# Patient Record
Sex: FEMALE | Race: WHITE | NOT HISPANIC OR LATINO | Employment: UNEMPLOYED | URBAN - METROPOLITAN AREA
[De-identification: names, ages, dates, MRNs, and addresses within clinical notes are randomized per-mention and may not be internally consistent; named-entity substitution may affect disease eponyms.]

---

## 2018-07-12 ENCOUNTER — OFFICE VISIT (OUTPATIENT)
Dept: GYNECOLOGY | Facility: CLINIC | Age: 43
End: 2018-07-12
Payer: COMMERCIAL

## 2018-07-12 VITALS
SYSTOLIC BLOOD PRESSURE: 120 MMHG | HEIGHT: 64 IN | BODY MASS INDEX: 27.62 KG/M2 | WEIGHT: 161.8 LBS | DIASTOLIC BLOOD PRESSURE: 70 MMHG

## 2018-07-12 DIAGNOSIS — N83.202 CYST OF LEFT OVARY: Primary | ICD-10-CM

## 2018-07-12 DIAGNOSIS — Z12.39 SCREENING FOR BREAST CANCER: ICD-10-CM

## 2018-07-12 PROCEDURE — 99242 OFF/OP CONSLTJ NEW/EST SF 20: CPT | Performed by: OBSTETRICS & GYNECOLOGY

## 2018-07-12 RX ORDER — MEDROXYPROGESTERONE ACETATE 10 MG/1
10 TABLET ORAL DAILY
Qty: 10 TABLET | Refills: 0 | Status: SHIPPED | OUTPATIENT
Start: 2018-07-12 | End: 2018-07-22

## 2018-07-12 RX ORDER — EPINEPHRINE 0.3 MG/.3ML
INJECTION SUBCUTANEOUS
COMMUNITY

## 2018-07-12 RX ORDER — MONTELUKAST SODIUM 10 MG/1
TABLET ORAL
COMMUNITY

## 2018-07-12 NOTE — PROGRESS NOTES
Patient is a 62-year-old  2 para 2 white female referred to the office by Orthopedics secondary to a left ovarian cyst   Patient has been having left hip pain some left lower back pain with some radiation down her left leg  According to the orthopedics notes feeling was bursitis in the left hip during the evaluation she had an MRI of the hip which revealed a septated left ovarian cyst measuring 4 4 cm  Patient has a history of SEVERIANO-III of the cervix and was status post cone biopsy she also had a total abdominal hysterectomy in  for persistent cervical dysplasia and for adenomyosis and a bicornuate uterus  She has had 1 prior  section for breech presentation  And 1 spontaneous vaginal delivery  Her past medical history is significant for asthma  The    Impression; left ovarian cyst    I reviewed these findings with Pascale  I suspect that this is a benign ovarian cyst   I recommended Provera 10 mg for 10 days and then return to the office for transvaginal sonography to re-evaluate the cyst   Patient has been advised to contact the office if she has increasing abdominal pain  Also recommended a Pap smear of the vaginal tissue when she comes in for the ultrasound    A screening mammogram is also been ordered

## 2018-08-15 ENCOUNTER — OFFICE VISIT (OUTPATIENT)
Dept: GYNECOLOGY | Facility: CLINIC | Age: 43
End: 2018-08-15
Payer: COMMERCIAL

## 2018-08-15 ENCOUNTER — ULTRASOUND (OUTPATIENT)
Dept: GYNECOLOGY | Facility: CLINIC | Age: 43
End: 2018-08-15
Payer: COMMERCIAL

## 2018-08-15 VITALS
SYSTOLIC BLOOD PRESSURE: 144 MMHG | BODY MASS INDEX: 27.31 KG/M2 | HEIGHT: 64 IN | DIASTOLIC BLOOD PRESSURE: 100 MMHG | WEIGHT: 160 LBS

## 2018-08-15 DIAGNOSIS — N83.201 CYST OF RIGHT OVARY: Primary | ICD-10-CM

## 2018-08-15 DIAGNOSIS — Z87.410 HISTORY OF CERVICAL DYSPLASIA: Primary | ICD-10-CM

## 2018-08-15 DIAGNOSIS — N83.202 CYST OF LEFT OVARY: ICD-10-CM

## 2018-08-15 DIAGNOSIS — Z01.419 ENCOUNTER FOR GYNECOLOGICAL EXAMINATION WITHOUT ABNORMAL FINDING: ICD-10-CM

## 2018-08-15 PROCEDURE — G0145 SCR C/V CYTO,THINLAYER,RESCR: HCPCS | Performed by: OBSTETRICS & GYNECOLOGY

## 2018-08-15 PROCEDURE — S0612 ANNUAL GYNECOLOGICAL EXAMINA: HCPCS | Performed by: OBSTETRICS & GYNECOLOGY

## 2018-08-15 PROCEDURE — 76830 TRANSVAGINAL US NON-OB: CPT | Performed by: OBSTETRICS & GYNECOLOGY

## 2018-08-15 NOTE — PROGRESS NOTES
Assessment/Plan:  Transvaginal scan results were discussed with patient  There is noted to be a 2 5 cm corpus luteum cyst on the right along with a follicle measuring 1 5 cm  And the left ovary there was an echogenic focus measuring 1 1 cm suggestive of either a resolving ovarian cyst versus possible dermoid tumor  There is also noted be a 4 9 cm left hydrosalpinx  Patient is not having any pain so the plan is to continue to follow this  She will return to the office in 3 months for repeat transvaginal sonography     Diagnoses and all orders for this visit:    History of cervical dysplasia    Encounter for gynecological examination without abnormal finding    Cyst of left ovary        Subjective:      Patient ID: Alcira Harley is a 44 y o  female  HPI   For annual exam and TVS   Patient was referred to our office secondary to off left ovarian cyst   During an MRI and there was noted to be a 4 4 cm septated left ovarian cyst   The patient had a prior total abdominal hysterectomy in  for persistent cervical dysplasia  She presents to the office today for an annual examination and a transvaginal scan  The following portions of the patient's history were reviewed and updated as appropriate:   She  has a past medical history of Ovarian cyst and Seasonal allergies  She There are no active problems to display for this patient  She  has a past surgical history that includes  section; Hysterectomy; and Gallbladder surgery  Her family history includes Heart disease in her mother; Lymphoma in her mother; Prostate cancer in her other; Stroke in her father  She  reports that she has never smoked  She has never used smokeless tobacco  She reports that she drinks alcohol  She reports that she does not use drugs    Current Outpatient Prescriptions   Medication Sig Dispense Refill    BREO ELLIPTA 200-25 MCG/INH inhaler       EPINEPHrine (EPIPEN) 0 3 mg/0 3 mL SOAJ epinephrine 0 3 mg/0 3 mL injection, auto-injector      medroxyPROGESTERone (PROVERA) 10 mg tablet Take 1 tablet (10 mg total) by mouth daily for 10 days 10 tablet 0    montelukast (SINGULAIR) 10 mg tablet montelukast 10 mg tablet       No current facility-administered medications for this visit  Current Outpatient Prescriptions on File Prior to Visit   Medication Sig    BREO ELLIPTA 200-25 MCG/INH inhaler     EPINEPHrine (EPIPEN) 0 3 mg/0 3 mL SOAJ epinephrine 0 3 mg/0 3 mL injection, auto-injector    medroxyPROGESTERone (PROVERA) 10 mg tablet Take 1 tablet (10 mg total) by mouth daily for 10 days    montelukast (SINGULAIR) 10 mg tablet montelukast 10 mg tablet     No current facility-administered medications on file prior to visit  She is allergic to no active allergies       Review of Systems   Constitutional: Negative  Gastrointestinal: Negative  Genitourinary: Negative  Objective: There were no vitals taken for this visit  Physical Exam   Constitutional: She appears well-developed and well-nourished  Neck: Normal range of motion  Neck supple  No thyromegaly present  Cardiovascular: Normal rate, regular rhythm and normal heart sounds  Pulmonary/Chest: Effort normal and breath sounds normal  Right breast exhibits no inverted nipple, no mass, no nipple discharge, no skin change and no tenderness  Left breast exhibits no inverted nipple, no mass, no nipple discharge, no skin change and no tenderness  Breasts are symmetrical    Abdominal: Soft  Bowel sounds are normal  She exhibits no distension and no mass  There is no tenderness  Hernia confirmed negative in the right inguinal area and confirmed negative in the left inguinal area  Genitourinary: There is no rash or lesion on the right labia  There is no rash or lesion on the left labia  Right adnexum displays no mass, no tenderness and no fullness  Left adnexum displays no mass, no tenderness and no fullness  No bleeding in the vagina   No vaginal discharge found  Lymphadenopathy:        Right: No inguinal adenopathy present  Left: No inguinal adenopathy present

## 2018-08-15 NOTE — PROGRESS NOTES
AMB US Pelvic Non OB  Date/Time: 8/15/2018 2:25 PM  Performed by: Clementina Bauer  Authorized by: Mery Baum     Procedure details:     Indications: ovarian cysts      Technique:  Transvaginal US, Non-OB    Position: lithotomy exam    Left ovary findings:     Left ovary:  Visualized    Diameter (mm):  18 7    Length (mm):  35 4    Width (mm):  19 5  Right ovary findings:     Right ovary:  Visualized    Diameter (mm):  31 8    Length (mm):  43 1    Width (mm):  35 7  Other findings:     Free pelvic fluid: not identified      Free peritoneal fluid: not identified    Post-Procedure Details:     Impression:  The uterus has been surgically removed  The right ovary demonstrates a corpus luteal cyst 2 7XQ and a follicle 0 9KM  The left ovary demonstrates an echogenic area which may be a resolving cyst, scar tissue or a forming dermoid cyst, 1 1cm  The left fallopian tube appears to be filled with fluid consistent with hydrosalpinx, 4 9cm just superior to the left ovary  No free fluid  Tolerance: Tolerated well, no immediate complications    Complications: no complications    Additional Procedure Comments:      crossvertiseiq P5 transvaginal transducer E8C with Serial Number [id number] was used during procedure and subsequently cleaned with high level disinfection utilizing the Thing[employer]

## 2018-08-21 LAB
LAB AP GYN PRIMARY INTERPRETATION: NORMAL
Lab: NORMAL

## 2018-11-07 ENCOUNTER — ULTRASOUND (OUTPATIENT)
Dept: GYNECOLOGY | Facility: CLINIC | Age: 43
End: 2018-11-07
Payer: COMMERCIAL

## 2018-11-07 ENCOUNTER — OFFICE VISIT (OUTPATIENT)
Dept: GYNECOLOGY | Facility: CLINIC | Age: 43
End: 2018-11-07
Payer: COMMERCIAL

## 2018-11-07 DIAGNOSIS — N83.202 CYSTS OF BOTH OVARIES: Primary | ICD-10-CM

## 2018-11-07 DIAGNOSIS — N83.201 CYSTS OF BOTH OVARIES: Primary | ICD-10-CM

## 2018-11-07 DIAGNOSIS — N70.11 HYDROSALPINX: ICD-10-CM

## 2018-11-07 PROCEDURE — 76830 TRANSVAGINAL US NON-OB: CPT | Performed by: OBSTETRICS & GYNECOLOGY

## 2018-11-07 NOTE — PROGRESS NOTES
Reviewed TVS findings with MARIA ISABEL  Has had prior LOY 2003 secondary to recurrent dysplasia  TVS:    Cysts of both ovaries [N83 201, N83 202]   []Hide copied text  []Ajitver for attribution information  AMB US Pelvic Non OB  Date/Time: 11/7/2018 1:06 PM  Performed by: Lindsay Childers  Authorized by: Gaynelle Kayser      Procedure details:     Indications: ovarian cysts      Technique:  Transvaginal US, Non-OB    Position: lithotomy exam    Left ovary findings:     Left ovary:  Visualized    Diameter (mm):  18 1    Length (mm):  33    Width (mm):  18 5  Right ovary findings:     Right ovary:  Visualized    Diameter (mm):  21 9    Length (mm):  33 5    Width (mm):  24 4  Other findings:     Free pelvic fluid: not identified      Free peritoneal fluid: not identified    Post-Procedure Details:     Impression:  The uterus has been surgically removed  The right ovary contains a 0 0EP follicle and a 3 0HG complex cystic mass (previously 2 5cm)  The left ovary contains an echogenic mass 1 2cm (previously 1 1cm)  The left adnexal region contains a 5 8cm hydrosalpinx (previously 4 8cm)  No free fluid  Tolerance: Tolerated well, no immediate complications    Complications: no complications    Additional Procedure Comments:      Reflexiq P5 transvaginal transducer E8C with Serial Number [id number] was used during procedure and subsequently cleaned with high level disinfection utilizing the [employer]        Discussed above findings with the patient basically no change in the cysts compared to the August ultrasound  She continues to have what appears to be a corpus luteum cyst hemorrhagic measuring 2 5 cm on the right ovary now 2 1 cm  She previously had a left echogenic cyst on the measuring 1 1 cm;  now 1 2 cm  She also has a persistent left hydrosalpinx approximately 5 cm    Patient is having minimal discomfort    Impression stable ovarian cysts/hydrosalpinx    Plan since she is asymptomatic the plan will be to recheck another ultrasound in is 3-6 months

## 2018-11-07 NOTE — PROGRESS NOTES
AMB US Pelvic Non OB  Date/Time: 11/7/2018 1:06 PM  Performed by: Sheila Coffey  Authorized by: Laurie Trevizo     Procedure details:     Indications: ovarian cysts      Technique:  Transvaginal US, Non-OB    Position: lithotomy exam    Left ovary findings:     Left ovary:  Visualized    Diameter (mm):  18 1    Length (mm):  33    Width (mm):  18 5  Right ovary findings:     Right ovary:  Visualized    Diameter (mm):  21 9    Length (mm):  33 5    Width (mm):  24 4  Other findings:     Free pelvic fluid: not identified      Free peritoneal fluid: not identified    Post-Procedure Details:     Impression:  The uterus has been surgically removed  The right ovary contains a 2 7HH follicle and a 2 7YO complex cystic mass (previously 2 5cm)  The left ovary contains an echogenic mass 1 2cm (previously 1 1cm)  The left adnexal region contains a 5 8cm hydrosalpinx (previously 4 8cm)  No free fluid  Tolerance: Tolerated well, no immediate complications    Complications: no complications    Additional Procedure Comments:      Myrioiq P5 transvaginal transducer E8C with Serial Number [id number] was used during procedure and subsequently cleaned with high level disinfection utilizing the [employer]

## 2024-11-05 ENCOUNTER — TELEPHONE (OUTPATIENT)
Age: 49
End: 2024-11-05

## 2024-11-05 NOTE — TELEPHONE ENCOUNTER
Caller called in for practice NPI number for whole group to fax over referral for patient. Provided caller with NPI number as well as office fax number.

## 2024-11-07 ENCOUNTER — PATIENT OUTREACH (OUTPATIENT)
Dept: BARIATRICS | Facility: CLINIC | Age: 49
End: 2024-11-07

## 2024-11-11 ENCOUNTER — OFFICE VISIT (OUTPATIENT)
Dept: BARIATRICS | Facility: CLINIC | Age: 49
End: 2024-11-11
Payer: COMMERCIAL

## 2024-11-11 VITALS
HEART RATE: 74 BPM | SYSTOLIC BLOOD PRESSURE: 110 MMHG | HEIGHT: 63 IN | DIASTOLIC BLOOD PRESSURE: 70 MMHG | TEMPERATURE: 96.3 F | WEIGHT: 166.8 LBS | BODY MASS INDEX: 29.55 KG/M2 | RESPIRATION RATE: 16 BRPM

## 2024-11-11 DIAGNOSIS — E66.3 OVERWEIGHT: Primary | ICD-10-CM

## 2024-11-11 DIAGNOSIS — K76.0 FATTY LIVER: ICD-10-CM

## 2024-11-11 PROCEDURE — 99204 OFFICE O/P NEW MOD 45 MIN: CPT | Performed by: PHYSICIAN ASSISTANT

## 2024-11-11 RX ORDER — TIRZEPATIDE 2.5 MG/.5ML
2.5 INJECTION, SOLUTION SUBCUTANEOUS WEEKLY
Qty: 2 ML | Refills: 0 | Status: SHIPPED | OUTPATIENT
Start: 2024-11-11 | End: 2024-12-09

## 2024-11-11 RX ORDER — ALBUTEROL SULFATE 90 UG/1
2 INHALANT RESPIRATORY (INHALATION) AS NEEDED
COMMUNITY

## 2024-11-11 RX ORDER — ESTRADIOL 0.04 MG/D
PATCH TRANSDERMAL
COMMUNITY

## 2024-11-11 NOTE — PATIENT INSTRUCTIONS
"Patient Education     Weight Loss Diet   About this topic   There are many \"trendy\" weight loss diets that are popular today. Many of these diets can end up being more harmful than helpful. The healthiest way to lose weight is to burn more calories than you eat.  A weight loss diet should help you have a healthy view of eating. It is NOT healthy to stop eating to try and lose weight. A good diet plan will help you cut down your food intake and make healthy choices.  A healthy weight loss goal is 1 to 2 pounds (0.5 to 1 kg) per week. Reducing calories in your diet, burning calories through exercise, or both can help you lose weight. Combining a healthy diet with regular physical activity can help you get the best results.  To cut calories in your diet you can:  Switch from whole milk to 1% or skim milk.  Switch from regular cheese to low-fat or fat-free cheese.  Use healthier condiment choices:  Fat-free or low-fat sour cream or salad dressings  Spray butter  Diet syrups or jellies over regular  Try frozen yogurt as a dessert rather than eating ice cream.  Skip the chips. Snack on carrots, vegetables, or fruit. If chips are a favorite of yours, try the baked style and watch portion size.  Eat grilled, roasted, boiled, broiled, or baked meats. Avoid deep-frying. Choose skinless poultry, lean red meat, lean cuts of pork, and fish for good protein sources.  Try flavored no-calorie bill. Do not drink soda and juices that have many calories.  Choose fruit instead of sweets.  General   Eating smaller meals more often may be helpful. This will keep you from overeating at your next meal. Also, eating meals slowly helps you feel full faster.  If eating 3 meals is a part of your lifestyle, choose more lean proteins and higher fiber foods to fill you up at each meal.  Do not skip meals. Most often if you skip a meal, you eat too much at the next meal.  Eat smaller portions. Use a smaller plate or bowl for meals, and when you " are eating out, eat half and take the rest home.  Plan ahead. Plan your meals and grocery list before going to the store. Planning will keep you from getting meals from restaurants.  Do not go to the grocery store hungry. You are more likely to buy snacks that are not good for you.  Portion out snacks. When you are having a snack, instead of grabbing the whole bag, portion a small amount out to give yourself a stopping point.  Drink water before and after your meals to help fill you up without the calories.  When eating starchy foods, choose whole-grain products. These have a lot of fiber which will make you feel full. Fiber also helps lower cholesterol and helps with bowel function.  If you need a helpful start, ask your doctor to send you to a dietitian for weight loss help.         What will the results be?   Losing excess weight will make your whole body healthier. You will have more energy for your daily activities and lower your risk for health problems.  What lifestyle changes are needed?   Stay active. Eating healthy is not always enough to lose weight. Burning calories by exercising is a big part of weight loss.  What foods are good to eat?   The key is to watch your portion sizes. It is best to choose foods that are lower in fat and calories.  Choose lean meats:  Boneless, skinless chicken breast  Pork loin  90% lean beef  Lean turkey meat  Fresh fish (not fried)  Choose low-fat dairy products:  1% or skim milk  Spray butter or margarine  Low-fat or fat-free cheese  Frozen yogurt or low-calorie ice cream  Choose fresh fruits, vegetables, beans and lentils, and whole wheat products more often.  Choose water to drink more often. Drink diet or no-calorie beverages when you want something other than water. Aim to get your calories from the foods you eat.  Choose smart snacks:  Fruits  Vegetables  Low-fat or nonfat yogurt  Low-fat or no-fat cheese, such as cottage cheese  Unsalted nuts  Hard-boiled  egg  Hummus  Guacamole  Natural peanut butter  Popcorn with no butter ? use pepper, garlic, or another spice to taste  Whole grain crackers  What foods should be limited or avoided?   Limit high-fat, high-sodium, and high-calorie foods like:  Fried foods  Processed meats  Whole-fat dairy products  Candy, cookies, chips, pastries  Sausage, osborn, any full-fat meats  Soda, juice  Beer, wine, and mixed drinks (alcohol)  Will there be any other care needed?   What do I do first before trying to lose weight?  Talk to your doctor and dietitian to see if you need to lose weight. Work with them to set your weight loss goals.  If you have a chronic illness, such as high blood sugar or high blood pressure, ask a doctor or dietitian what diet and exercise is right for you.  Ask your doctor about how much you are able to exercise and what type of exercise is good for you.  Helpful tips   Keep a food journal to help keep you on track.  Join a support group.  Tips for burning calories:  If your workplace is near your house, choose to walk or bike to work instead of driving.  Take 20-minute walks each day. Walk around during your lunch break. You will not only burn calories, but will raise your energy for the rest of the day.  Take the stairs over the elevator.  Join a gym or exercise class with a friend.  Try to exercise 30 minutes a day for overall health. Three 10-minute sessions work too. Aim for 60 to 90 minutes a day to lose weight.  Drink lots of water before, during, and after exercise.  Last Reviewed Date   2021-06-24  Consumer Information Use and Disclaimer   This generalized information is a limited summary of diagnosis, treatment, and/or medication information. It is not meant to be comprehensive and should be used as a tool to help the user understand and/or assess potential diagnostic and treatment options. It does NOT include all information about conditions, treatments, medications, side effects, or risks that may  apply to a specific patient. It is not intended to be medical advice or a substitute for the medical advice, diagnosis, or treatment of a health care provider based on the health care provider's examination and assessment of a patient’s specific and unique circumstances. Patients must speak with a health care provider for complete information about their health, medical questions, and treatment options, including any risks or benefits regarding use of medications. This information does not endorse any treatments or medications as safe, effective, or approved for treating a specific patient. UpToDate, Inc[employer] and its affiliates disclaim any warranty or liability relating to this information or the use thereof. The use of this information is governed by the Terms of Use, available at https://www.woltersKnowledgeMilluwer.com/en/know/clinical-effectiveness-terms   Copyright   Copyright © 2024 UpToDate, Inc. and its affiliates and/or licensors. All rights reserved.

## 2024-11-11 NOTE — PROGRESS NOTES
Assessment/Plan:    Overweight  -Discussed options of HealthyCORE-Intensive Lifestyle Intervention Program, Very Low Calorie Diet-VLCD, and Conservative Program and the role of weight loss medications.Explained the importance of making lifestyle changes if utilizing medication to aid in weight loss  -Initial weight loss goal of 5-10% weight loss for improved health  -Screening labs and records reviewed from prior  - STOP BANG-0/8    -Patient is interested in pursuing conservative program    Goals:  -Food log (ie.) www.[employer]-1100 calories  -try to have a protein in the AM   - To drink at least 64oz of water daily.No sugary beverages.  -recommend to add in resistance training     To start on zepbound.To start on initial dose of medication and then to titrate as tolerated.  They have tried more than 6 months of lifestyle modifications including diet and activity changes and has had insignificant weight loss of less than 1 lb a week. Patient denies personal and family history of MCT and MEN2 tumors. Patient denies personal history of pancreatitis. Side effects discussed but not limited to diarrhea, bloating, constipation, GI upset, heartburn, increased heart rate, headache, low blood sugar, fatigue and dizziness. Titration and medication administration discussed.  Medication agreement signed 11/11/24    Initial Weight:166.8lb  Goal Weight:140lb        Fatty liver  Diagnosed in her 20s.   -should improve with weight loss, dietary, and lifestyle changes  -recommend CMP      Return in about 3 months (around 2/11/2025).      Diagnoses and all orders for this visit:    Overweight  -     CBC and differential; Future  -     Comprehensive metabolic panel; Future  -     Lipid panel; Future  -     TSH, 3rd generation; Future  -     tirzepatide (Zepbound) 2.5 mg/0.5 mL auto-injector; Inject 0.5 mL (2.5 mg total) under the skin once a week for 28 days    Fatty liver  -     CBC and differential;  Future  -     Comprehensive metabolic panel; Future  -     Lipid panel; Future  -     TSH, 3rd generation; Future  -     tirzepatide (Zepbound) 2.5 mg/0.5 mL auto-injector; Inject 0.5 mL (2.5 mg total) under the skin once a week for 28 days    Other orders  -     albuterol (PROVENTIL HFA,VENTOLIN HFA) 90 mcg/act inhaler; Inhale 2 puffs if needed  -     estradiol (CLIMARA) 0.0375 MG/24HR; APPLY 1 PATCH TOPICALLY TO SKIN ONCE A WEEK          Subjective:   Chief Complaint   Patient presents with    Consult     MWM- Consult; GW-140lb; Waist-33in; Stop Bang-0/8       Patient ID: Pascale Porras  is a 49 y.o. female with excess weight/obesity here to pursue weight management.    Past Medical History:   Diagnosis Date    Ovarian cyst     Seasonal allergies        HPI: Here for MWM consult  She noticed more weight gain with menopause over the last year.  She is doing high protein.   had bariatric surgery and is doing higher protein    Typically eating 1 meal a day and a snack in evening.      Cravings: some sweets    Obesity/Excess Weight:  Severity:  overweight  Onset:  years    Modifiers: Diet and Exercise. Did thrive program before (helped with weight loss). 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  She   Patient Active Problem List    Diagnosis Date Noted    Overweight 11/11/2024    Fatty liver 11/11/2024     She  has a past surgical history that includes   section; Hysterectomy; and Gallbladder surgery.  Her family history includes Heart disease in her mother; Lymphoma in her mother; Prostate cancer in her other; Stroke in her father.  She  reports that she has never smoked. She has never used smokeless tobacco. She reports current alcohol use. She reports that she does not use drugs.  Current Outpatient Medications   Medication Sig Dispense Refill    albuterol (PROVENTIL HFA,VENTOLIN HFA) 90 mcg/act inhaler Inhale 2 puffs if needed      EPINEPHrine (EPIPEN) 0.3 mg/0.3 mL SOAJ as needed      estradiol (CLIMARA) 0.0375 MG/24HR APPLY 1 PATCH TOPICALLY TO SKIN ONCE A WEEK      montelukast (SINGULAIR) 10 mg tablet montelukast 10 mg tablet      tirzepatide (Zepbound) 2.5 mg/0.5 mL auto-injector Inject 0.5 mL (2.5 mg total) under the skin once a week for 28 days 2 mL 0    BREO ELLIPTA 200-25 MCG/INH inhaler  (Patient not taking: Reported on 2024)      medroxyPROGESTERone (PROVERA) 10 mg tablet Take 1 tablet (10 mg total) by mouth daily for 10 days 10 tablet 0     No current facility-administered medications for this visit.     Current Outpatient Medications on File Prior to Visit   Medication Sig    albuterol (PROVENTIL HFA,VENTOLIN HFA) 90 mcg/act inhaler Inhale 2 puffs if needed    EPINEPHrine (EPIPEN) 0.3 mg/0.3 mL SOAJ as needed    estradiol (CLIMARA) 0.0375 MG/24HR APPLY 1 PATCH TOPICALLY TO SKIN ONCE A WEEK    montelukast (SINGULAIR) 10 mg tablet montelukast 10 mg tablet    BREO ELLIPTA 200-25 MCG/INH inhaler  (Patient not taking: Reported on 2024)    medroxyPROGESTERone (PROVERA) 10 mg tablet Take 1 tablet (10 mg total) by mouth daily for 10 days     No current facility-administered medications on file prior to visit.     She is allergic to gluten meal - food allergy and pollen extract..    Review of Systems   Constitutional:  Negative for fever.   Respiratory:  Negative for shortness of breath.    Cardiovascular:  Negative for chest pain and  "palpitations.   Gastrointestinal:  Negative for abdominal pain, constipation, diarrhea and vomiting.   Genitourinary:  Negative for difficulty urinating.   Skin:  Negative for rash.   Neurological:  Negative for headaches.   Psychiatric/Behavioral:  Negative for dysphoric mood. The patient is not nervous/anxious.        Objective:    /70 (BP Location: Left arm, Patient Position: Sitting)   Pulse 74   Temp (!) 96.3 °F (35.7 °C) (Tympanic)   Resp 16   Ht 5' 3\" (1.6 m)   Wt 75.7 kg (166 lb 12.8 oz)   BMI 29.55 kg/m²     Physical Exam  Vitals and nursing note reviewed.   Constitutional:       General: She is not in acute distress.     Appearance: She is well-developed.      Comments: overweight   HENT:      Head: Normocephalic and atraumatic.   Eyes:      Conjunctiva/sclera: Conjunctivae normal.   Neck:      Thyroid: No thyromegaly.   Pulmonary:      Effort: Pulmonary effort is normal. No respiratory distress.   Skin:     Findings: No rash (visible).   Neurological:      Mental Status: She is alert and oriented to person, place, and time.   Psychiatric:         Mood and Affect: Mood normal.         Behavior: Behavior normal.        "

## 2024-11-11 NOTE — ASSESSMENT & PLAN NOTE
Diagnosed in her 20s.   -should improve with weight loss, dietary, and lifestyle changes  -recommend CMP

## 2024-11-11 NOTE — ASSESSMENT & PLAN NOTE
-Discussed options of [facility]-Intensive Lifestyle Intervention Program, Very Low Calorie Diet-VLCD, and Conservative Program and the role of weight loss medications.Explained the importance of making lifestyle changes if utilizing medication to aid in weight loss  -Initial weight loss goal of 5-10% weight loss for improved health  -Screening labs and records reviewed from prior  - STOP BANG-0/8    -Patient is interested in pursuing conservative program    Goals:  -Food log (ie.) www.[employer]-1100 calories  -try to have a protein in the AM   - To drink at least 64oz of water daily.No sugary beverages.  -recommend to add in resistance training     To start on zepbound.To start on initial dose of medication and then to titrate as tolerated.  They have tried more than 6 months of lifestyle modifications including diet and activity changes and has had insignificant weight loss of less than 1 lb a week. Patient denies personal and family history of MCT and MEN2 tumors. Patient denies personal history of pancreatitis. Side effects discussed but not limited to diarrhea, bloating, constipation, GI upset, heartburn, increased heart rate, headache, low blood sugar, fatigue and dizziness. Titration and medication administration discussed.  Medication agreement signed 11/11/24    Initial Weight:166.8lb  Goal Weight:140lb

## 2024-11-14 ENCOUNTER — TELEPHONE (OUTPATIENT)
Dept: BARIATRICS | Facility: CLINIC | Age: 49
End: 2024-11-14

## 2024-11-14 NOTE — TELEPHONE ENCOUNTER
PA for Zepbound SUBMITTED to [employer]    via    [x]CMM-KEY: [id number]  []Surescripts-Case ID #    []Availity-Auth ID #  NDC #    []Faxed to plan   []Other website    []Phone call Case ID #      []PA sent as URGENT    All office notes, labs and other pertaining documents and studies sent. Clinical questions answered. Awaiting determination from insurance company.     Turnaround time for your insurance to make a decision on your Prior Authorization can take 7-21 business days.

## 2024-11-18 NOTE — TELEPHONE ENCOUNTER
PA for Zepbound 2.5 mg  APPROVED     Date(s) approved 10/15/2024-7/12/2025    Case #    Patient advised by          [][employer] Message  []Phone call   [x]LMOM  []L/M to call office as no active Communication consent on file  []Unable to leave detailed message as VM not approved on Communication consent       Pharmacy advised by    []Fax  [x]Phone call    Approval letter scanned into [employer] - On covermymeds

## 2024-12-09 ENCOUNTER — TELEPHONE (OUTPATIENT)
Age: 49
End: 2024-12-09

## 2024-12-09 DIAGNOSIS — K76.0 FATTY LIVER: ICD-10-CM

## 2024-12-09 DIAGNOSIS — E66.3 OVERWEIGHT: Primary | ICD-10-CM

## 2024-12-09 RX ORDER — TIRZEPATIDE 5 MG/.5ML
5 INJECTION, SOLUTION SUBCUTANEOUS WEEKLY
Qty: 2 ML | Refills: 1 | Status: SHIPPED | OUTPATIENT
Start: 2024-12-09 | End: 2025-02-03

## 2024-12-09 NOTE — TELEPHONE ENCOUNTER
Patient called for a refill of her Zepbound .5ml/2.5mg. I did not put the refill request in because she was questioning if she should move up to the next level. She said she lost 7 pounds in month and has no symptoms.  Please speak to Philippe and call patient to let her know what provider recommends. Put in RX refill accordingly.

## 2025-01-28 DIAGNOSIS — K76.0 FATTY LIVER: ICD-10-CM

## 2025-01-28 DIAGNOSIS — E66.3 OVERWEIGHT: ICD-10-CM

## 2025-01-28 RX ORDER — TIRZEPATIDE 5 MG/.5ML
INJECTION, SOLUTION SUBCUTANEOUS
Qty: 2 ML | Refills: 0 | Status: SHIPPED | OUTPATIENT
Start: 2025-01-28

## 2025-02-09 NOTE — PROGRESS NOTES
Assessment & Plan  History of class 1 Obesity  Initial weight: 166 11/2024   Current weight: 148  TBW loss%: 10.8    Medication: Increase Zepbound from 5 mg to 7.5.  Patient reports that she has been more protein and fluid into her dietary regimen.      Encouraged adequate amount of fluids and protein throughout the day to help promote weight loss. Recommend practicing with frequent meals/snacks (3 meals 2-3 snacks daily) as it can potentially improve metabolism and allow for better portion control by decreasing Ghrelin (hunger hormone)     Mindful Eating and Drinking is necessary to promote healthy behaviors that can lead to decreased adipose tissue which can be curative and preventative for comorbidities such as hypertension, diabetes, anxiety, depression, osteoarthritis, dyslipidemia, asthma, liver disease, cardiovascular disease, stroke, sleep apnea and cancers.  Patient's co-morbilities include asthma, fatty liver    Fluid intake which is at least half your body weight in ounces is necessary to help control cravings (decreasing confusion for appetite vs water deprivation) as the human body is made up of 50-70% of Fluids. If there is a diversion for water alone, would recommend flavored water (example-splash of lemonade or ice tea) to help promote compliance. Fluids include Teas, water, flavored water, seltzer water, coffee, shakes     Protein is necessary to promote satiety, metabolism, and muscle growth/repair. Increased energy expenditure is used for the processes of breaking down and rebuilding proteins within the body when eating meals that are protein based      Carbohydrates are essential as it is the body's main fuel source for daily activities.  Recommend that carbohydrates be consumed mostly during the times you are more active during the day      Fats: Essential vitamins like A, D, and E, protect your organs, support cell growth, and contribute to maintaining healthy cholesterol levels       Metabolism:  Metabolism can also be promoted by nutrition, meal frequency and increased muscle mass     Daily Calorie Needs: are individualized and will need to take into account any fluid losses, increased activity levels which may need to be adjusted daily. Recommended to not skip any meals even if there is a lack of appetite as it can be suppressed by caffeine or any prior heavy meal due to Leptin (satiety hormone).  Calorie and fluid intake will need to be increased if there is any increased activity during the day compared to previous days.  With proper fluid and macronutrient intake throughout the day, portion control and decreased cravings will improve     Weight check: BMI and weight serve as only guidelines as it is not factor in muscle mass, fat, water. Weights can fluctuate depending on fluid shifts vs what foods are consumed prior to checking your weight.  Fatty liver        Return in about 2 months (around 4/10/2025) for followup .     Most recent notes, labs and previous medical records were reviewed.       ______________________________________________________________________        Subjective:     Chief Complaint   Patient presents with    Follow-up     MWM-3M F/u; Waist-30in       HPI: 50-year-old female with past medical history of fatty liver presents to weight loss management clinic for followup.  Patient is happy with her current results and reports that she has increased her protein intake as well as fluids.  She states that she has plateaued around the same weight.    Medication: Zepbound 5mg    Previous Regimen  Did thrive program before (helped with weight loss). Sometiems boredom eats but not often bored  Contributing factors: Insufficient time to make appropriate lifestyle changes + FH of obesity  Patient was eating 1 meal a day and a snack in evening.      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    All other systems reviewed and are negative.     Objective:  /73   Pulse 77   Temp 98.6 °F (37 °C)   Resp 16   Ht 5' 3\" (1.6 m)   Wt 67.2 kg (148 lb 3.2 oz)   BMI 26.25 kg/m²     Wt Readings from Last 30 Encounters:   02/10/25 67.2 kg (148 lb 3.2 oz)   11/11/24 75.7 kg (166 lb 12.8 oz)   08/15/18 72.6 kg (160 lb)   07/12/18 73.4 kg (161 lb 12.8 oz)       Physical Exam  Constitutional:       General: No acute distress.  Well-nourished  HENT:      Head: Normocephalic and atraumatic.   Eyes:      Extraocular Movements: Extraocular movements intact.      Conjunctiva/pupils: Conjunctivae normal. Pupils are equal, round  Pulmonary:      Effort: Pulmonary effort is normal. No labored breathing   Neurological:      General: No focal deficit present.  AO x 3     Mental Status: Alert and oriented to person, place, and time. Mental status is at baseline.   Psychiatric:         Mood and Affect: Mood normal.         Behavior: Behavior normal.     Labs and Imaging  Recent labs and imaging have been personally reviewed.  "

## 2025-02-10 ENCOUNTER — OFFICE VISIT (OUTPATIENT)
Dept: BARIATRICS | Facility: CLINIC | Age: 50
End: 2025-02-10
Payer: COMMERCIAL

## 2025-02-10 VITALS
HEIGHT: 63 IN | TEMPERATURE: 98.6 F | RESPIRATION RATE: 16 BRPM | BODY MASS INDEX: 26.26 KG/M2 | DIASTOLIC BLOOD PRESSURE: 73 MMHG | SYSTOLIC BLOOD PRESSURE: 110 MMHG | HEART RATE: 77 BPM | WEIGHT: 148.2 LBS

## 2025-02-10 DIAGNOSIS — E66.811 CLASS 1 OBESITY WITH BODY MASS INDEX (BMI) OF 34.0 TO 34.9 IN ADULT: Primary | ICD-10-CM

## 2025-02-10 DIAGNOSIS — K76.0 FATTY LIVER: ICD-10-CM

## 2025-02-10 PROCEDURE — 99213 OFFICE O/P EST LOW 20 MIN: CPT | Performed by: STUDENT IN AN ORGANIZED HEALTH CARE EDUCATION/TRAINING PROGRAM

## 2025-02-10 RX ORDER — TIRZEPATIDE 7.5 MG/.5ML
7.5 INJECTION, SOLUTION SUBCUTANEOUS WEEKLY
Qty: 2 ML | Refills: 1 | Status: SHIPPED | OUTPATIENT
Start: 2025-02-10

## 2025-04-10 DIAGNOSIS — E66.811 CLASS 1 OBESITY WITH BODY MASS INDEX (BMI) OF 34.0 TO 34.9 IN ADULT: ICD-10-CM

## 2025-04-10 DIAGNOSIS — K76.0 FATTY LIVER: ICD-10-CM

## 2025-04-10 NOTE — TELEPHONE ENCOUNTER
Patient called to request a refill for their tirzepatide (Zepbound) 7.5 mg/0.5 mL auto-injector  advised a refill was requested on 4/10/25 and is pending approval. Patient verbalized understanding and is in agreement.     Does the patient have enough for 3 days?   [] Yes   [x] No - Send as HP to POD

## 2025-04-11 DIAGNOSIS — E66.811 CLASS 1 OBESITY WITH BODY MASS INDEX (BMI) OF 34.0 TO 34.9 IN ADULT: ICD-10-CM

## 2025-04-11 DIAGNOSIS — K76.0 FATTY LIVER: ICD-10-CM

## 2025-04-11 RX ORDER — TIRZEPATIDE 7.5 MG/.5ML
7.5 INJECTION, SOLUTION SUBCUTANEOUS WEEKLY
Qty: 2 ML | Refills: 4 | Status: SHIPPED | OUTPATIENT
Start: 2025-04-11

## 2025-04-11 RX ORDER — TIRZEPATIDE 7.5 MG/.5ML
INJECTION, SOLUTION SUBCUTANEOUS
Qty: 4 ML | Refills: 0 | OUTPATIENT
Start: 2025-04-11

## 2025-04-13 NOTE — PROGRESS NOTES
Assessment & Plan  BMI 24    Initial weight: 166 11/2024   Previous weight: : 148  Current weight: 138    TBW loss%: 16.8    Medication: Continue Zepbound 7.5    Patient understands the importance of making lifestyle changes as recommended below to aid in weight loss.      Dietary Recommendations:  Recommend to avoid skipping any meals. Adequate amount of Macronutrients (minimal 3 meals a day) is necessary to help improve metabolism, satiety and allow for better portion control by decreasing Ghrelin (hunger hormone). Lack of hunger can be suppressed by a hormone called Leptin (full hormone) which can occur from a previous meal or caffeine intake    Protein intake throughout the day can help promote satiety and is necessary for muscle growth/repair    Carbohydrates are essential as it is the vital source of fuel for daily activities. energy, cell function, nutrient absorption, and hormone production.     Fats: Essential vitamins like A, D, and E, support cell growth, function and are necessary for nutrient absorption to support your organs     Fluid intake which is at least half your body weight in ounces is necessary to help control cravings (decreasing confusion for appetite vs water deprivation) as the human body is made up of 50-70% of Fluids. If there is a diversion for water alone, would recommend flavored water (example-splash of lemonade or ice tea) to help promote compliance. Fluids include Teas, water, flavored water, seltzer water, coffee, shakes    Metabolism:    Metabolism can also be promoted by macronutrient intake and increased muscle weight via thermogenesis      Daily Calorie Needs: Recommend to take into account any fluid losses and calories burned via increased activity levels as daily calories may need to be adjusted     Weight check: Weights can fluctuate depending on fluid shifts vs what foods are consumed prior to checking your weight          Return in about 4 months (around 8/14/2025) for  "followup .     Most recent notes, labs and previous medical records were reviewed. Total time with chart review and with the patient: 35 min      ______________________________________________________________________        Subjective:     Chief Complaint   Patient presents with    Follow-up     MWM 2 month F/U   , waist  28 inch .       HPI: 50 y.o. female with pmh of dilated presents for follow-up    Wt Hx  Did thrive program before.  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    All other systems reviewed and are negative.     Objective:  /68 (Patient Position: Sitting, Cuff Size: Standard)   Pulse 78   Temp (!) 97.1 °F (36.2 °C) (Tympanic)   Ht 5' 3\" (1.6 m)   Wt 63 kg (138 lb 12.8 oz)   BMI 24.59 kg/m²     Wt Readings from Last 30 Encounters:   04/14/25 63 kg (138 lb 12.8 oz)   02/10/25 67.2 kg (148 lb 3.2 oz)   11/11/24 75.7 kg (166 lb 12.8 oz)   08/15/18 72.6 kg (160 lb)   07/12/18 73.4 kg (161 lb 12.8 oz)       Physical Exam  Constitutional:       General: No acute distress.  Well-nourished  HENT:      Head: Normocephalic and atraumatic.   Eyes:      Extraocular Movements: Extraocular movements intact.      Conjunctiva/pupils: Conjunctivae normal. Pupils are equal, round  Pulmonary:      Effort: Pulmonary effort is normal. No labored breathing   Neurological:      General: No focal deficit present.  AO x 3     Mental Status: Alert and oriented to person, place, and time. Mental status is " at baseline.   Psychiatric:         Mood and Affect: Mood normal.         Behavior: Behavior normal.     Labs and Imaging  Recent labs and imaging have been personally reviewed.

## 2025-04-14 ENCOUNTER — OFFICE VISIT (OUTPATIENT)
Dept: BARIATRICS | Facility: CLINIC | Age: 50
End: 2025-04-14
Payer: COMMERCIAL

## 2025-04-14 VITALS
WEIGHT: 138.8 LBS | BODY MASS INDEX: 24.59 KG/M2 | HEIGHT: 63 IN | TEMPERATURE: 97.1 F | HEART RATE: 78 BPM | SYSTOLIC BLOOD PRESSURE: 110 MMHG | DIASTOLIC BLOOD PRESSURE: 68 MMHG

## 2025-04-14 DIAGNOSIS — E66.3 OVERWEIGHT: Primary | ICD-10-CM

## 2025-04-14 PROCEDURE — 99214 OFFICE O/P EST MOD 30 MIN: CPT | Performed by: STUDENT IN AN ORGANIZED HEALTH CARE EDUCATION/TRAINING PROGRAM

## 2025-05-30 ENCOUNTER — TELEPHONE (OUTPATIENT)
Age: 50
End: 2025-05-30

## 2025-05-30 DIAGNOSIS — E66.811 CLASS 1 OBESITY WITH BODY MASS INDEX (BMI) OF 34.0 TO 34.9 IN ADULT: Primary | ICD-10-CM

## 2025-05-30 DIAGNOSIS — K76.0 FATTY LIVER: ICD-10-CM

## 2025-05-30 RX ORDER — TIRZEPATIDE 10 MG/.5ML
10 INJECTION, SOLUTION SUBCUTANEOUS WEEKLY
Qty: 2 ML | Refills: 0 | Status: SHIPPED | OUTPATIENT
Start: 2025-05-30 | End: 2025-06-27

## 2025-05-30 NOTE — TELEPHONE ENCOUNTER
How are you tolerating the medication?   [] Nausea  [] Vomiting  [] Diarrhea  [x] Asymptomatic  [] Other:    Last visit weight: 138    Current weight: 138    Date of last injection: 5/23    How many injections do you have left: 1 dosage left being taken today at 4 PM    Patient would like to titrate up to the next dosage of Zepbound as she has been feeling more hunger and gained a few pounds. Please call the patient at [phone] with any questions as her next appt is not until 8/11/25.

## 2025-06-25 DIAGNOSIS — E66.811 CLASS 1 OBESITY WITH BODY MASS INDEX (BMI) OF 34.0 TO 34.9 IN ADULT: ICD-10-CM

## 2025-06-25 DIAGNOSIS — K76.0 FATTY LIVER: ICD-10-CM

## 2025-06-25 RX ORDER — TIRZEPATIDE 10 MG/.5ML
INJECTION, SOLUTION SUBCUTANEOUS
Qty: 12 ML | Refills: 0 | Status: SHIPPED | OUTPATIENT
Start: 2025-06-25

## 2025-07-28 ENCOUNTER — TELEPHONE (OUTPATIENT)
Dept: BARIATRICS | Facility: CLINIC | Age: 50
End: 2025-07-28

## 2025-08-11 ENCOUNTER — OFFICE VISIT (OUTPATIENT)
Dept: BARIATRICS | Facility: CLINIC | Age: 50
End: 2025-08-11
Payer: COMMERCIAL

## 2025-08-19 ENCOUNTER — TELEPHONE (OUTPATIENT)
Dept: BARIATRICS | Facility: CLINIC | Age: 50
End: 2025-08-19